# Patient Record
Sex: MALE | Race: BLACK OR AFRICAN AMERICAN
[De-identification: names, ages, dates, MRNs, and addresses within clinical notes are randomized per-mention and may not be internally consistent; named-entity substitution may affect disease eponyms.]

---

## 2017-03-17 NOTE — HP
CIWA Score





- CIWA Score


Nausea/Vomitin-Mild Nausea/No Vomiting


Muscle Tremors: 3


Anxiety: 4-Mod. Anxious/Guarded


Agitation: 4-Moderately Restless


Paroxysmal Sweats: 1-Minimal Palms Moist


Orientation: 3-Disoriented Date>2 days


Tacttile Disturbances: 0-None


Auditory Disturbances: 0-None


Visual Disturbances: 0-None


Headache: 0-None Present


CIWA-Ar Total Score: 16





Admission ROS S





- HPI


Chief Complaint: 


WITHDRAWAL SX


Allergies/Adverse Reactions: 


 Allergies











Allergy/AdvReac Type Severity Reaction Status Date / Time


 


No Known Drug Allergies Allergy   Verified 17 17:32


 


Pork/Porcine Containing AdvReac Intermediate Vomiting Verified 17 17:32





Products     











History of Present Illness: 


51 YEARS OLD MALE WITH LONG HISTORY OF ALCOHOL COCAINE NICOTINE DEPENDENCE, 

DENIES MEDICAL ISSUE HAS BIPOLAR II TREATED WITH WELLBUTRIN DEPAKOTE AND 

TRAZODONE IS ADMITTED TO DETOX


Exam Limitations: No Limitations





- Ebola screening


Have you traveled outside of the country in the last 21 days: No


Have you had contact with anyone from an Ebola affected area: No


Have you been sick,other than usual withdrawal symptoms: No


Do you have a fever: No





- Review of Systems


Constitutional: Chills, Changes in sleep, Weight Stable


EENT: reports: No Symptoms Reported


Respiratory: reports: No Symptoms reported


Cardiac: reports: No Symptoms Reported


GI: reports: Nausea, Poor Fluid Intake, Abdominal cramping


: reports: No Symptoms Reported


Musculoskeletal: reports: Back Pain


Integumentary: reports: Erythema (BOTH FEET)


Neuro: reports: Tremors


Endocrine: reports: No Symptoms Reported


Hematology: reports: No Symptoms Reported


Psychiatric: reports: Judgement Intact


Other Systems: Reviewed and Negative





Patient History





- Patient Medical History


Hx Anemia: No


Hx Asthma: No


Hx Chronic Obstructive Pulmonary Disease (COPD): No


Hx Cancer: No


Hx Cardiac Disorders: No


Hx Congestive Heart Failure: No


Hx Hypertension: No


Hx Hypercholesterolemia: No


Hx Pacemaker: No


HX Cerebrovascular Accident: No


Hx Seizures: No


Hx Dementia: No


Hx Diabetes: No


Hx Gastrointestinal Disorders: No


Hx Liver Disease: No


Hx Genitourinary Disorders: No


Hx Sexually Transmitted Disorders: No


Hx Renal Disease (ESRD): No


Hx Thyroid Disease: No


Hx Human Immunodeficiency Virus (HIV): No


Hx Hepatitis C: No


Hx Depression: No


Hx Suicide Attempt: Yes (jumped in front of the car last )


Hx Bipolar Disorder: Yes (wellbutrim and rispedal)


Hx Schizophrenia: No





- Patient Surgical History


Past Surgical History: Yes


Hx Neurologic Surgery: No


Hx Cataract Extraction: No


Hx Cardiac Surgery: No


Hx Lung Surgery: No


Hx Breast Surgery: No


Hx Breast Biopsy: No


Hx Abdominal Surgery: No


Hx Appendectomy: Yes (in )


Hx Cholecystectomy: No


Hx Genitourinary Surgery: No


Hx Orthopedic Surgery: No


Other Surgical History: RIGHT GROIN HERNIA REPAIRED 


Anesthesia Reaction: No





- PPD History


Previous Implant?: Yes


Documented Results: Negative w/proof


Implanted On Prior [facility] Admission?: Yes


Date: 10/30/16


PPD to be Administered?: No





- Smoking Cessation


Smoking history: Current every day smoker


Have you smoked in the past 12 months: Yes


Aproximately how many cigarettes per day: 6


Cigars Per Day: 0


Hx Chewing Tobacco Use: No


Initiated information on smoking cessation: Yes


'Breaking Loose' booklet given: 17





- Substance & Tx. History


Hx Alcohol Use: Yes


Hx Substance Use: Yes


Substance Use Type: Alcohol, Cocaine


Hx Substance Use Treatment: Yes





- Substances Abused


  ** Alcohol


Route: Oral


Frequency: Daily


Amount used: PINT WENDY


Age of first use: 39


Date of Last Use: 17





Family Disease History





- Family Disease History


Family Disease History: Heart Disease: Brother





Admission Physical Exam [facility]





- Vital Signs


Vital Signs: 


 Vital Signs - 24 hr











  17





  16:28


 


Temperature 97.4 F L


 


Pulse Rate 67


 


Respiratory 20





Rate 


 


Blood Pressure 123/72














- Physical


General Appearance: Yes: Appropriately Dressed, Mild Distress, Obese, Tremorous

, Irritable, Sweating, Anxious


HEENTM: Yes: Hearing grossly Normal, Normal ENT Inspection, Normocephalic, 

Normal Voice


Respiratory: Yes: Chest Non-Tender, Lungs Clear, Normal Breath Sounds, No 

Respiratory Distress, No Accessory Muscle Use


Neck: Yes: Supple, Trachea in good position


Breast: Yes: Breasts Symetrical


Cardiology: Yes: Regular Rhythm, Regular Rate, S1, S2


Abdominal: Yes: Non Tender, Soft


Genitourinary: Yes: Within Normal Limits


Back: Yes: Normal Inspection


Musculoskeletal: Yes: full range of Motion, Gait Steady, Joint swelling (FEET)


Extremities: Yes: Normal Range of Motion, Non-Tender, Tremors, Erythema (FEET)


Neurological: Yes: Alert, Motor Strength 5/5, Normal Response, Depressed Affect


Integumentary: Yes: Warm


Lymphatic: Yes: Within Normal Limits





- Diagnostic


(1) Alcohol dependence with withdrawal


Current Visit: Yes   Status: Acute   Qualifiers: 


     Complication of substance-induced condition: uncomplicated        

Qualified Code(s): F10.230 - Alcohol dependence with withdrawal, uncomplicated  





(2) Nicotine dependence


Current Visit: Yes   Status: Acute   Qualifiers: 


     Nicotine product type: cigarettes     Substance use status: in withdrawal 

       Qualified Code(s): F17.213 - Nicotine dependence, cigarettes, with 

withdrawal  


Comment: ONE PACK X 3 DAYS








(3) Cocaine dependence, uncomplicated


Current Visit: Yes   Status: Chronic





(4) Bipolar II disorder


Current Visit: Yes   Status: Suspected


Comment: DEPAKOTE LEVEL PENDING








(5) Bilateral swelling of feet


Current Visit: Yes   Status: Acute








Cleared for Admission [facility]





- Detox or Rehab


[facility] Level of Care: Medically Managed


Detox Regimen/Protocol: Librium





[facility] Breath Alcohol Content


Breath Alcohol Content: 0





Urine Drug Screen





- Results


Drug Screen Negative: No


Urine Drug Screen Results: SARAVANAN-Cocaine, TCA-Tricyclic Antidepress

## 2017-03-18 NOTE — CONSULT
[facility] Psychiatric Consult





- Data


Date of interview: 03/18/17


Admission source: [facility]


Identifying data: Readmission to [facility] for this 50 y/o AA male seeking 

detox treatment for alcohol and cocaine dependence.Patient is single without 

children,domiciled (currently at [facility]),unemployed and deprived of any 

source of income (Public Assistance discontinued : reason not known).


Substance Abuse History: Smoking Cessation.  Smoking history: Current every day 

smoker.  Have you smoked in the past 12 months: Yes.  Aproximately how many 

cigarettes per day: 6.  Cigars Per Day: 0.  Hx Chewing Tobacco Use: No.  

Initiated information on smoking cessation: Yes.  'Breaking Loose' booklet given

: 03/17/17.  - Substance & Tx. History.  Hx Alcohol Use: Yes.  Hx Substance Use

: Yes.  Substance Use Type: Alcohol, Cocaine.  Hx Substance Use Treatment: Yes.

  - Substances Abused.  ** Alcohol.  Route: Oral.  Frequency: Daily.  Amount 

used: PINT WENDY.  Age of first use: 39.  Date of Last Use: 03/17/17.  

Confirmed by patient.


Medical History: History of right inguinal herniorraphy,sciatica and 

appendectomy.


Psychiatric History: First contact with Psychiatry : 2005.Patient was 

incarcerated at the time.Diagnosed with Bipolar Disorder and prescribed 

depakote and wellbutrin.No history of psychiatric hospitalizations but 

treatment has continued through services available at various residential drug 

treatment programs ([facility]),[facility] and jails.Mr Dewey reports that he is currently maintained on a regimen of depakote and 

risperdal (doses not recalled).He is a poor historian.Patient denies history of 

suicide attempts but [facility] report indicates a past suicide attempt (2003) via " 

jumping into traffic."


Physical/Sexual Abuse/Trauma History: Patient denies.


Additional Comment: Urine Drug Screen Results: SARAVANAN-Cocaine, TCA-Tricyclic 

Antidepressant.Noted.





Mental Status Exam





- Mental Status Exam


Alert and Oriented to: Time, Place, Person


Cognitive Function: Grossly Intact


Patient Appearance: Disheveled


Mood: Withdrawn


Affect: Mood Congruent


Patient Behavior: Sedated (mildly sedated), Fatigued


Speech Pattern: Clear, Delayed


Voice Loudness: Normal


Thought Process: Goal Oriented


Thought Disorder: Not Present


Hallucinations: Denies


Suicidal Ideation: Denies


Insight/Judgement: Poor


Sleep: Fair


Appetite: Good


Muscle strength/Tone: Normal


Gait/Station: Normal





Psychiatric Findings





- Problem List (Axis 1, 2,3)


(1) Alcohol dependence with withdrawal


Current Visit: Yes   Status: Acute   Qualifiers: 


     Complication of substance-induced condition: uncomplicated        

Qualified Code(s): F10.230 - Alcohol dependence with withdrawal, uncomplicated  





(2) Cocaine dependence, uncomplicated


Current Visit: Yes   Status: Acute





(3) Nicotine dependence


Current Visit: Yes   Status: Acute   Qualifiers: 


     Nicotine product type: cigarettes     Substance use status: in withdrawal 

       Qualified Code(s): F17.213 - Nicotine dependence, cigarettes, with 

withdrawal  


Comment: ONE PACK X 3 DAYS








(4) Bipolar disorder


Current Visit: Yes   Status: Chronic   





(5) Sciatica, left side


Current Visit: No   Status: Chronic





(6) Bilateral swelling of feet


Current Visit: Yes   Status: Acute








- Initial Treatment Plan


Initial Treatment Plan: Psychoeducation.Detoxification.Pharmacy claims 

reviewed.Most recent refills were issued on 11/2/16 for depakote,risperdal and 

bupropion.Mr Dewey states that he does not remember the last time he took 

medications " It has been a while." Medications resumed as follows : depakote 

500 mg po bid + risperdal 1 mg po bid.Side effects/benefits discussed with 

patient.He agrees with this careplan.Depakote level is pending.Observation.

## 2017-03-18 NOTE — PN
[facility] CIWA





- CIWA Score


Nausea/Vomitin-No Nausea/No Vomiting


Muscle Tremors: 4-Moderate,w/Arms Extend


Anxiety: 3


Agitation: 2


Paroxysmal Sweats: 3


Orientation: 4Disoriented Place/Person


Tacttile Disturbances: 3-Moderate Itch/Numb/Burn


Auditory Disturbances: 0-None


Visual Disturbances: 0-None


Headache: 0-None Present


CIWA-Ar Total Score: 19





[facility] Progress Note (SOAP)


Subjective: 


Tremors, Body aches, Back Ache.


Objective: 


PT. A & O X 1 (DISORIENTED ABOUT DAY/DATE AND ABOUT LOCATION).





17 13:50


 Vital Signs











Temperature  97 F L  17 13:05


 


Pulse Rate  81   17 13:05


 


Respiratory Rate  19   17 13:05


 


Blood Pressure  130/86   17 13:05


 


O2 Sat by Pulse Oximetry (%)      








 Laboratory Last Values











WBC  11.6 K/mm3 (4.0-10.0)  H D 17  06:00    


 


RBC  4.18 M/mm3 (4.00-5.60)   17  06:00    


 


Hgb  12.7 GM/dL (11.7-16.9)   17  06:00    


 


Hct  38.5 % (35.4-49)   17  06:00    


 


MCV  92.2 fl (80-96)   17  06:00    


 


MCHC  33.0 g/dl (32.0-35.9)   17  06:00    


 


RDW  12.5 % (11.9-15.9)   17  06:00    


 


Plt Count  225 K/MM3 (134-434)  D 17  06:00    


 


MPV  8.8 fl (7.5-11.1)  D 17  06:00    


 


Sodium  141 mmol/L (136-145)   17  06:00    


 


Potassium  3.2 mmol/L (3.5-5.1)  L  17  06:00    


 


Chloride  104 mmol/L ()   17  06:00    


 


Carbon Dioxide  31 mmol/L (21-32)  D 17  06:00    


 


Anion Gap  6  (8-16)  L  17  06:00    


 


BUN  20 mg/dL (7-18)  H D 17  06:00    


 


Creatinine  1.1 mg/dL (0.7-1.3)  D 17  06:00    


 


Creat Clearance w eGFR  > 60  (>60)   17  06:00    


 


Random Glucose  137 mg/dL ()  H D 17  06:00    


 


Calcium  8.3 mg/dL (8.5-10.1)  L  17  06:00    


 


Total Bilirubin  0.6 mg/dL (0.2-1.0)  D 17  06:00    


 


AST  139 U/L (15-37)  H D 17  06:00    


 


ALT  51 U/L (12-78)  D 17  06:00    


 


Alkaline Phosphatase  81 U/L ()   17  06:00    


 


Total Protein  7.3 g/dl (6.4-8.2)  D 17  06:00    


 


Albumin  4.1 g/dl (3.4-5.0)  D 17  06:00    


 


Urine Color  Dkyellow   17  21:35    


 


Urine Appearance  Clear   17  21:35    


 


Urine pH  5.0  (5.0-8.0)   17  21:35    


 


Ur Specific Gravity  1.033  (1.001-1.035)   17  21:35    


 


Urine Protein  1+  (NEGATIVE)  H  17  21:35    


 


Urine Glucose (UA)  Negative  (NEGATIVE)   17  21:35    


 


Urine Ketones  1+  (NEGATIVE)  H  17  21:35    


 


Urine Blood  Negative  (NEGATIVE)   17  21:35    


 


Urine Nitrite  Negative  (NEGATIVE)   17  21:35    


 


Urine Bilirubin  Negative  (NEGATIVE)   17  21:35    


 


Urine Urobilinogen  4.0 e.u/dl E.U./dl (0.2-1.0)   17  21:35    


 


Ur Leukocyte Esterase  Negative  (NEGATIVE)   17  21:35    


 


Urine RBC  8 /hpf (0-3)   17  21:35    


 


Urine WBC  2 /hpf (3-5)   17  21:35    


 


Ur Epithelial Cells  Rare /hpf (FEW)   17  21:35    


 


Urine Mucus  Few   17  21:35    


 


Valproic Acid  < 3.000 ug/ml ()  L  17  06:00    








LABS NOTED.





17 13:55





Assessment: 





17 13:55


WITHDRAWAL SYMPTOMS.


Plan: 


CONTINUE DETOX.


K-DUR, 40 MEQ NOW AND THEN 20 MEQ BID AFTER.


BGM ABK TOMORROW FOR ELEVATED RANDOM GLUCOSE ON ADMISSION.


ADVISED PATIENT TO FOLLOW-UP WITH  / REHAB MEDICAL PROVIDER AFTER DISCHARGE 

FROM DETOX FOR GENERAL MEDICAL ASSESSMENT AND  ABNORMAL LAB VALUES.

## 2017-03-19 NOTE — PN
S CIWA





- CIWA Score


Nausea/Vomiting: 3


Muscle Tremors: 2


Anxiety: 4-Mod. Anxious/Guarded


Agitation: 4-Moderately Restless


Paroxysmal Sweats: No Perspiration


Orientation: 0-Oriented


Tacttile Disturbances: 0-None


Auditory Disturbances: 0-None


Visual Disturbances: 0-None


Headache: 3-Moderate


CIWA-Ar Total Score: 16





[facility] Progress Note (SOAP)


Subjective: 


Anxiety, Body Aches, tremors, Interrupted Sleep, Diarrhea  


Objective: 


 Vital Signs











Temperature  96 F L  03/19/17 06:22


 


Pulse Rate  94 H  03/19/17 06:22


 


Respiratory Rate  18   03/19/17 06:22


 


Blood Pressure  138/84   03/19/17 06:22


 


O2 Sat by Pulse Oximetry (%)      








 Laboratory Last Values











WBC  11.6 K/mm3 (4.0-10.0)  H D 03/18/17  06:00    


 


RBC  4.18 M/mm3 (4.00-5.60)   03/18/17  06:00    


 


Hgb  12.7 GM/dL (11.7-16.9)   03/18/17  06:00    


 


Hct  38.5 % (35.4-49)   03/18/17  06:00    


 


MCV  92.2 fl (80-96)   03/18/17  06:00    


 


MCHC  33.0 g/dl (32.0-35.9)   03/18/17  06:00    


 


RDW  12.5 % (11.9-15.9)   03/18/17  06:00    


 


Plt Count  225 K/MM3 (134-434)  D 03/18/17  06:00    


 


MPV  8.8 fl (7.5-11.1)  D 03/18/17  06:00    


 


Sodium  141 mmol/L (136-145)   03/18/17  06:00    


 


Potassium  3.2 mmol/L (3.5-5.1)  L  03/18/17  06:00    


 


Chloride  104 mmol/L ()   03/18/17  06:00    


 


Carbon Dioxide  31 mmol/L (21-32)  D 03/18/17  06:00    


 


Anion Gap  6  (8-16)  L  03/18/17  06:00    


 


BUN  20 mg/dL (7-18)  H D 03/18/17  06:00    


 


Creatinine  1.1 mg/dL (0.7-1.3)  D 03/18/17  06:00    


 


Creat Clearance w eGFR  > 60  (>60)   03/18/17  06:00    


 


POC Glucometer  123 UNITS (())   03/19/17  05:57    


 


Random Glucose  137 mg/dL ()  H D 03/18/17  06:00    


 


Calcium  8.3 mg/dL (8.5-10.1)  L  03/18/17  06:00    


 


Total Bilirubin  0.6 mg/dL (0.2-1.0)  D 03/18/17  06:00    


 


AST  139 U/L (15-37)  H D 03/18/17  06:00    


 


ALT  51 U/L (12-78)  D 03/18/17  06:00    


 


Alkaline Phosphatase  81 U/L ()   03/18/17  06:00    


 


Total Protein  7.3 g/dl (6.4-8.2)  D 03/18/17  06:00    


 


Albumin  4.1 g/dl (3.4-5.0)  D 03/18/17  06:00    


 


Urine Color  Dkyellow   03/17/17  21:35    


 


Urine Appearance  Clear   03/17/17  21:35    


 


Urine pH  5.0  (5.0-8.0)   03/17/17  21:35    


 


Ur Specific Gravity  1.033  (1.001-1.035)   03/17/17  21:35    


 


Urine Protein  1+  (NEGATIVE)  H  03/17/17  21:35    


 


Urine Glucose (UA)  Negative  (NEGATIVE)   03/17/17  21:35    


 


Urine Ketones  1+  (NEGATIVE)  H  03/17/17  21:35    


 


Urine Blood  Negative  (NEGATIVE)   03/17/17  21:35    


 


Urine Nitrite  Negative  (NEGATIVE)   03/17/17  21:35    


 


Urine Bilirubin  Negative  (NEGATIVE)   03/17/17  21:35    


 


Urine Urobilinogen  4.0 e.u/dl E.U./dl (0.2-1.0)   03/17/17  21:35    


 


Ur Leukocyte Esterase  Negative  (NEGATIVE)   03/17/17  21:35    


 


Urine RBC  8 /hpf (0-3)   03/17/17  21:35    


 


Urine WBC  2 /hpf (3-5)   03/17/17  21:35    


 


Ur Epithelial Cells  Rare /hpf (FEW)   03/17/17  21:35    


 


Urine Mucus  Few   03/17/17  21:35    


 


Valproic Acid  < 3.000 ug/ml ()  L  03/18/17  06:00    


 


RPR Titer  Nonreactive  (NONREACTIVE)   03/18/17  06:00    








labs noted


hypokalemia


Assessment: 


Withdrawal Symptoms


Plan: 


Continue Detox

## 2017-03-19 NOTE — EKG
Test Reason : 

Blood Pressure : ***/*** mmHG

Vent. Rate : 070 BPM     Atrial Rate : 070 BPM

   P-R Int : 166 ms          QRS Dur : 090 ms

    QT Int : 368 ms       P-R-T Axes : 053 052 033 degrees

   QTc Int : 397 ms

 

NORMAL SINUS RHYTHM

NONSPECIFIC T WAVE ABNORMALITY

ABNORMAL ECG

NO PREVIOUS ECGS AVAILABLE

Confirmed by CRISTOFER PAGIE MD ([id number]) on 3/19/2017 11:39:29 AM

 

Referred By:             Confirmed By:CRISTOFER PAIGE MD

## 2017-03-20 NOTE — PN
[facility] Progress Note (SOAP)


Subjective: 


Sweating,interrupted sleep,restless


Objective: 





03/20/17 10:25


 Vital Signs - 8 hr











  03/20/17 03/20/17 03/20/17





  03:26 06:16 09:28


 


Temperature  96.6 F L 96 F L


 


Pulse Rate  89 85


 


Respiratory 18 18 20





Rate   


 


Blood Pressure  131/82 141/88








 Laboratory Last Values











WBC  11.6 K/mm3 (4.0-10.0)  H D 03/18/17  06:00    


 


RBC  4.18 M/mm3 (4.00-5.60)   03/18/17  06:00    


 


Hgb  12.7 GM/dL (11.7-16.9)   03/18/17  06:00    


 


Hct  38.5 % (35.4-49)   03/18/17  06:00    


 


MCV  92.2 fl (80-96)   03/18/17  06:00    


 


MCHC  33.0 g/dl (32.0-35.9)   03/18/17  06:00    


 


RDW  12.5 % (11.9-15.9)   03/18/17  06:00    


 


Plt Count  225 K/MM3 (134-434)  D 03/18/17  06:00    


 


MPV  8.8 fl (7.5-11.1)  D 03/18/17  06:00    


 


Sodium  141 mmol/L (136-145)   03/18/17  06:00    


 


Potassium  3.2 mmol/L (3.5-5.1)  L  03/18/17  06:00    


 


Chloride  104 mmol/L ()   03/18/17  06:00    


 


Carbon Dioxide  31 mmol/L (21-32)  D 03/18/17  06:00    


 


Anion Gap  6  (8-16)  L  03/18/17  06:00    


 


BUN  20 mg/dL (7-18)  H D 03/18/17  06:00    


 


Creatinine  1.1 mg/dL (0.7-1.3)  D 03/18/17  06:00    


 


Creat Clearance w eGFR  > 60  (>60)   03/18/17  06:00    


 


POC Glucometer  139 UNITS (())   03/20/17  06:12    


 


Random Glucose  137 mg/dL ()  H D 03/18/17  06:00    


 


Calcium  8.3 mg/dL (8.5-10.1)  L  03/18/17  06:00    


 


Total Bilirubin  0.6 mg/dL (0.2-1.0)  D 03/18/17  06:00    


 


AST  139 U/L (15-37)  H D 03/18/17  06:00    


 


ALT  51 U/L (12-78)  D 03/18/17  06:00    


 


Alkaline Phosphatase  81 U/L ()   03/18/17  06:00    


 


Total Protein  7.3 g/dl (6.4-8.2)  D 03/18/17  06:00    


 


Albumin  4.1 g/dl (3.4-5.0)  D 03/18/17  06:00    


 


Urine Color  Dkyellow   03/17/17  21:35    


 


Urine Appearance  Clear   03/17/17  21:35    


 


Urine pH  5.0  (5.0-8.0)   03/17/17  21:35    


 


Ur Specific Gravity  1.033  (1.001-1.035)   03/17/17  21:35    


 


Urine Protein  1+  (NEGATIVE)  H  03/17/17  21:35    


 


Urine Glucose (UA)  Negative  (NEGATIVE)   03/17/17  21:35    


 


Urine Ketones  1+  (NEGATIVE)  H  03/17/17  21:35    


 


Urine Blood  Negative  (NEGATIVE)   03/17/17  21:35    


 


Urine Nitrite  Negative  (NEGATIVE)   03/17/17  21:35    


 


Urine Bilirubin  Negative  (NEGATIVE)   03/17/17  21:35    


 


Urine Urobilinogen  4.0 e.u/dl E.U./dl (0.2-1.0)   03/17/17  21:35    


 


Ur Leukocyte Esterase  Negative  (NEGATIVE)   03/17/17  21:35    


 


Urine RBC  8 /hpf (0-3)   03/17/17  21:35    


 


Urine WBC  2 /hpf (3-5)   03/17/17  21:35    


 


Ur Epithelial Cells  Rare /hpf (FEW)   03/17/17  21:35    


 


Urine Mucus  Few   03/17/17  21:35    


 


Valproic Acid  < 3.000 ug/ml ()  L  03/18/17  06:00    


 


RPR Titer  Nonreactive  (NONREACTIVE)   03/18/17  06:00    








labs noted


Assessment: 





03/20/17 10:25


Withdrawal sx.


Plan: 


Continue detox

## 2017-03-21 NOTE — DS
[facility] Detox Discharge Summary


Admission Date: 


03/17/17





Discharge Date: 03/21/17





- History


Present History: Alcohol Dependence, Cocaine Dependence


Pertinent Past History: 


Chronic edema of the ankles/feet





- Physical Exam Results


Vital Signs: 


 Vital Signs











Temperature  96.2 F L  03/21/17 06:16


 


Pulse Rate  80   03/21/17 06:16


 


Respiratory Rate  18   03/21/17 06:16


 


Blood Pressure  135/88   03/21/17 06:16


 


O2 Sat by Pulse Oximetry (%)      











Pertinent Admission Physical Exam Findings: 


Withdrawal sx.


 Laboratory Last Values











WBC  11.6 K/mm3 (4.0-10.0)  H D 03/18/17  06:00    


 


RBC  4.18 M/mm3 (4.00-5.60)   03/18/17  06:00    


 


Hgb  12.7 GM/dL (11.7-16.9)   03/18/17  06:00    


 


Hct  38.5 % (35.4-49)   03/18/17  06:00    


 


MCV  92.2 fl (80-96)   03/18/17  06:00    


 


MCHC  33.0 g/dl (32.0-35.9)   03/18/17  06:00    


 


RDW  12.5 % (11.9-15.9)   03/18/17  06:00    


 


Plt Count  225 K/MM3 (134-434)  D 03/18/17  06:00    


 


MPV  8.8 fl (7.5-11.1)  D 03/18/17  06:00    


 


Sodium  141 mmol/L (136-145)   03/18/17  06:00    


 


Potassium  3.2 mmol/L (3.5-5.1)  L  03/18/17  06:00    


 


Chloride  104 mmol/L ()   03/18/17  06:00    


 


Carbon Dioxide  31 mmol/L (21-32)  D 03/18/17  06:00    


 


Anion Gap  6  (8-16)  L  03/18/17  06:00    


 


BUN  20 mg/dL (7-18)  H D 03/18/17  06:00    


 


Creatinine  1.1 mg/dL (0.7-1.3)  D 03/18/17  06:00    


 


Creat Clearance w eGFR  > 60  (>60)   03/18/17  06:00    


 


POC Glucometer  139 UNITS (())   03/20/17  06:12    


 


Random Glucose  137 mg/dL ()  H D 03/18/17  06:00    


 


Calcium  8.3 mg/dL (8.5-10.1)  L  03/18/17  06:00    


 


Total Bilirubin  0.6 mg/dL (0.2-1.0)  D 03/18/17  06:00    


 


AST  139 U/L (15-37)  H D 03/18/17  06:00    


 


ALT  51 U/L (12-78)  D 03/18/17  06:00    


 


Alkaline Phosphatase  81 U/L ()   03/18/17  06:00    


 


Total Protein  7.3 g/dl (6.4-8.2)  D 03/18/17  06:00    


 


Albumin  4.1 g/dl (3.4-5.0)  D 03/18/17  06:00    


 


Urine Color  Dkyellow   03/17/17  21:35    


 


Urine Appearance  Clear   03/17/17  21:35    


 


Urine pH  5.0  (5.0-8.0)   03/17/17  21:35    


 


Ur Specific Gravity  1.033  (1.001-1.035)   03/17/17  21:35    


 


Urine Protein  1+  (NEGATIVE)  H  03/17/17  21:35    


 


Urine Glucose (UA)  Negative  (NEGATIVE)   03/17/17  21:35    


 


Urine Ketones  1+  (NEGATIVE)  H  03/17/17  21:35    


 


Urine Blood  Negative  (NEGATIVE)   03/17/17  21:35    


 


Urine Nitrite  Negative  (NEGATIVE)   03/17/17  21:35    


 


Urine Bilirubin  Negative  (NEGATIVE)   03/17/17  21:35    


 


Urine Urobilinogen  4.0 e.u/dl E.U./dl (0.2-1.0)   03/17/17  21:35    


 


Ur Leukocyte Esterase  Negative  (NEGATIVE)   03/17/17  21:35    


 


Urine RBC  8 /hpf (0-3)   03/17/17  21:35    


 


Urine WBC  2 /hpf (3-5)   03/17/17  21:35    


 


Ur Epithelial Cells  Rare /hpf (FEW)   03/17/17  21:35    


 


Urine Mucus  Few   03/17/17  21:35    


 


Valproic Acid  < 3.000 ug/ml ()  L  03/18/17  06:00    


 


RPR Titer  Nonreactive  (NONREACTIVE)   03/18/17  06:00    








k+ replacement given





- Treatment


Hospital Course: Detox Protocol Followed, Detoxed Safely, Responded well, 

Discharged Condition Good, Rehab Referral Accepted


Patient has Accepted a Rehab Referral to: [facility]





- Medication


Discharge Medications: 


Ambulatory Orders





BUPROPion HCL "SR" [Wellbutrin Sr -] 150 mg PO HS 09/04/14 


Bupropion HCl [Wellbutrin Xl -] 150 mg PO DAILY #30 tab.sr.24h 11/02/16 


Divalproex [Depakote -] 1,000 mg PO HS #60 tablet.ec 11/02/16 


Divalproex [Depakote -] 1,000 mg PO HS #60 tablet.ec 11/02/16 


Risperidone [Risperdal -] 3 mg PO HS #30 tablet 11/02/16 


Risperidone [Risperdal -] 3 mg PO HS #30 tablet 11/02/16 


Divalproex [Depakote -] 500 mg PO BID #30 tablet.ec 03/18/17 


Risperidone [Risperdal] 2 mg PO HS #30 tablet 03/18/17 











- Diagnosis


(1) Alcohol dependence with withdrawal


Current Visit: Yes   Status: Acute   Qualifiers: 


     Complication of substance-induced condition: uncomplicated        

Qualified Code(s): F10.230 - Alcohol dependence with withdrawal, uncomplicated  





(2) Cocaine dependence, uncomplicated


Current Visit: Yes   Status: Acute





(3) Nicotine dependence


Current Visit: Yes   Status: Acute   Qualifiers: 


     Nicotine product type: cigarettes     Substance use status: in withdrawal 

       Qualified Code(s): F17.213 - Nicotine dependence, cigarettes, with 

withdrawal  





(4) Bipolar disorder


Current Visit: Yes   Status: Chronic   





(5) Hypokalemia


Current Visit: Yes   Status: Acute








- AMA


Did Patient Leave Against Medical Advice: No

## 2023-01-13 ENCOUNTER — HOSPITAL ENCOUNTER (INPATIENT)
Dept: HOSPITAL 74 - YASAS | Age: 58
LOS: 2 days | Discharge: LEFT BEFORE DISCHARGE | DRG: 770 | End: 2023-01-15
Attending: SURGERY | Admitting: SURGERY
Payer: COMMERCIAL

## 2023-01-13 VITALS — BODY MASS INDEX: 35.4 KG/M2

## 2023-01-13 DIAGNOSIS — U07.1: ICD-10-CM

## 2023-01-13 DIAGNOSIS — F14.20: ICD-10-CM

## 2023-01-13 DIAGNOSIS — F10.230: Primary | ICD-10-CM

## 2023-01-13 DIAGNOSIS — F41.9: ICD-10-CM

## 2023-01-13 DIAGNOSIS — F17.210: ICD-10-CM

## 2023-01-13 DIAGNOSIS — F19.24: ICD-10-CM

## 2023-01-13 DIAGNOSIS — M54.32: ICD-10-CM

## 2023-01-13 DIAGNOSIS — F31.9: ICD-10-CM

## 2023-01-13 PROCEDURE — U0005 INFEC AGEN DETEC AMPLI PROBE: HCPCS

## 2023-01-13 PROCEDURE — U0003 INFECTIOUS AGENT DETECTION BY NUCLEIC ACID (DNA OR RNA); SEVERE ACUTE RESPIRATORY SYNDROME CORONAVIRUS 2 (SARS-COV-2) (CORONAVIRUS DISEASE [COVID-19]), AMPLIFIED PROBE TECHNIQUE, MAKING USE OF HIGH THROUGHPUT TECHNOLOGIES AS DESCRIBED BY CMS-2020-01-R: HCPCS

## 2023-01-13 PROCEDURE — HZ2ZZZZ DETOXIFICATION SERVICES FOR SUBSTANCE ABUSE TREATMENT: ICD-10-PCS | Performed by: SURGERY

## 2023-01-13 RX ADMIN — Medication SCH MG: at 22:59

## 2023-01-14 RX ADMIN — Medication SCH TAB: at 10:48

## 2023-01-14 RX ADMIN — Medication SCH MG: at 22:54

## 2023-01-14 RX ADMIN — NICOTINE SCH: 14 PATCH, EXTENDED RELEASE TRANSDERMAL at 10:54

## 2023-01-15 VITALS
HEART RATE: 66 BPM | SYSTOLIC BLOOD PRESSURE: 143 MMHG | TEMPERATURE: 98 F | DIASTOLIC BLOOD PRESSURE: 82 MMHG | RESPIRATION RATE: 17 BRPM

## 2023-01-15 LAB
ALBUMIN SERPL-MCNC: 3.3 G/DL (ref 3.4–5)
ALP SERPL-CCNC: 100 U/L (ref 45–117)
ALT SERPL-CCNC: 15 U/L (ref 13–61)
ANION GAP SERPL CALC-SCNC: 4 MMOL/L (ref 8–16)
AST SERPL-CCNC: 13 U/L (ref 15–37)
BILIRUB SERPL-MCNC: 0.3 MG/DL (ref 0.2–1)
BUN SERPL-MCNC: 10.4 MG/DL (ref 7–18)
CALCIUM SERPL-MCNC: 8.1 MG/DL (ref 8.5–10.1)
CHLORIDE SERPL-SCNC: 108 MMOL/L (ref 98–107)
CO2 SERPL-SCNC: 28 MMOL/L (ref 21–32)
CREAT SERPL-MCNC: 1 MG/DL (ref 0.55–1.3)
DEPRECATED RDW RBC AUTO: 12.3 % (ref 11.9–15.9)
GLUCOSE SERPL-MCNC: 106 MG/DL (ref 74–106)
HCT VFR BLD CALC: 39.2 % (ref 35.4–49)
HGB BLD-MCNC: 13.2 GM/DL (ref 11.7–16.9)
MCH RBC QN AUTO: 30.6 PG (ref 25.7–33.7)
MCHC RBC AUTO-ENTMCNC: 33.6 G/DL (ref 32–35.9)
MCV RBC: 91 FL (ref 80–96)
PLATELET # BLD AUTO: 291 10^3/UL (ref 134–434)
PMV BLD: 7.8 FL (ref 7.5–11.1)
PROT SERPL-MCNC: 6.6 G/DL (ref 6.4–8.2)
RBC # BLD AUTO: 4.31 M/MM3 (ref 4–5.6)
SODIUM SERPL-SCNC: 140 MMOL/L (ref 136–145)
WBC # BLD AUTO: 6.2 K/MM3 (ref 4–10)

## 2023-01-15 RX ADMIN — Medication SCH TAB: at 10:37

## 2023-01-15 RX ADMIN — NICOTINE SCH: 14 PATCH, EXTENDED RELEASE TRANSDERMAL at 10:36

## 2024-03-07 ENCOUNTER — APPOINTMENT (OUTPATIENT)
Dept: ORTHOPEDIC SURGERY | Facility: CLINIC | Age: 59
End: 2024-03-07

## 2024-03-07 PROBLEM — Z00.00 ENCOUNTER FOR PREVENTIVE HEALTH EXAMINATION: Status: ACTIVE | Noted: 2024-03-07
